# Patient Record
Sex: MALE | Race: WHITE | HISPANIC OR LATINO | URBAN - METROPOLITAN AREA
[De-identification: names, ages, dates, MRNs, and addresses within clinical notes are randomized per-mention and may not be internally consistent; named-entity substitution may affect disease eponyms.]

---

## 2024-07-27 ENCOUNTER — EMERGENCY (EMERGENCY)
Facility: HOSPITAL | Age: 69
LOS: 1 days | Discharge: HOME | End: 2024-07-27
Attending: STUDENT IN AN ORGANIZED HEALTH CARE EDUCATION/TRAINING PROGRAM
Payer: SELF-PAY

## 2024-07-27 VITALS
WEIGHT: 136.69 LBS | HEIGHT: 66.93 IN | OXYGEN SATURATION: 98 % | DIASTOLIC BLOOD PRESSURE: 83 MMHG | HEART RATE: 72 BPM | TEMPERATURE: 98 F | SYSTOLIC BLOOD PRESSURE: 123 MMHG | RESPIRATION RATE: 20 BRPM

## 2024-07-27 DIAGNOSIS — R07.9 CHEST PAIN, UNSPECIFIED: ICD-10-CM

## 2024-07-27 LAB
ALBUMIN SERPL ELPH-MCNC: 3.9 G/DL — SIGNIFICANT CHANGE UP (ref 3.3–5.2)
ALP SERPL-CCNC: 128 U/L — HIGH (ref 40–120)
ALT FLD-CCNC: 27 U/L — SIGNIFICANT CHANGE UP
ANION GAP SERPL CALC-SCNC: 9 MMOL/L — SIGNIFICANT CHANGE UP (ref 5–17)
APTT BLD: 29 SEC — SIGNIFICANT CHANGE UP (ref 24.5–35.6)
AST SERPL-CCNC: 31 U/L — SIGNIFICANT CHANGE UP
BASOPHILS # BLD AUTO: 0.06 K/UL — SIGNIFICANT CHANGE UP (ref 0–0.2)
BASOPHILS NFR BLD AUTO: 0.8 % — SIGNIFICANT CHANGE UP (ref 0–2)
BILIRUB SERPL-MCNC: 0.3 MG/DL — LOW (ref 0.4–2)
BUN SERPL-MCNC: 22.7 MG/DL — HIGH (ref 8–20)
CALCIUM SERPL-MCNC: 8.6 MG/DL — SIGNIFICANT CHANGE UP (ref 8.4–10.5)
CHLORIDE SERPL-SCNC: 106 MMOL/L — SIGNIFICANT CHANGE UP (ref 96–108)
CO2 SERPL-SCNC: 24 MMOL/L — SIGNIFICANT CHANGE UP (ref 22–29)
CREAT SERPL-MCNC: 1.15 MG/DL — SIGNIFICANT CHANGE UP (ref 0.5–1.3)
D DIMER BLD IA.RAPID-MCNC: <150 NG/ML DDU — SIGNIFICANT CHANGE UP
EGFR: 68 ML/MIN/1.73M2 — SIGNIFICANT CHANGE UP
EOSINOPHIL # BLD AUTO: 0.19 K/UL — SIGNIFICANT CHANGE UP (ref 0–0.5)
EOSINOPHIL NFR BLD AUTO: 2.5 % — SIGNIFICANT CHANGE UP (ref 0–6)
GLUCOSE SERPL-MCNC: 97 MG/DL — SIGNIFICANT CHANGE UP (ref 70–99)
HCT VFR BLD CALC: 35 % — LOW (ref 39–50)
HGB BLD-MCNC: 12.1 G/DL — LOW (ref 13–17)
IMM GRANULOCYTES NFR BLD AUTO: 0.1 % — SIGNIFICANT CHANGE UP (ref 0–0.9)
INR BLD: 1.38 RATIO — HIGH (ref 0.85–1.18)
LIDOCAIN IGE QN: 15 U/L — LOW (ref 22–51)
LYMPHOCYTES # BLD AUTO: 2.34 K/UL — SIGNIFICANT CHANGE UP (ref 1–3.3)
LYMPHOCYTES # BLD AUTO: 30.5 % — SIGNIFICANT CHANGE UP (ref 13–44)
MAGNESIUM SERPL-MCNC: 2 MG/DL — SIGNIFICANT CHANGE UP (ref 1.6–2.6)
MCHC RBC-ENTMCNC: 29.3 PG — SIGNIFICANT CHANGE UP (ref 27–34)
MCHC RBC-ENTMCNC: 34.6 GM/DL — SIGNIFICANT CHANGE UP (ref 32–36)
MCV RBC AUTO: 84.7 FL — SIGNIFICANT CHANGE UP (ref 80–100)
MONOCYTES # BLD AUTO: 0.57 K/UL — SIGNIFICANT CHANGE UP (ref 0–0.9)
MONOCYTES NFR BLD AUTO: 7.4 % — SIGNIFICANT CHANGE UP (ref 2–14)
NEUTROPHILS # BLD AUTO: 4.49 K/UL — SIGNIFICANT CHANGE UP (ref 1.8–7.4)
NEUTROPHILS NFR BLD AUTO: 58.7 % — SIGNIFICANT CHANGE UP (ref 43–77)
NT-PROBNP SERPL-SCNC: 43 PG/ML — SIGNIFICANT CHANGE UP (ref 0–300)
PLATELET # BLD AUTO: 219 K/UL — SIGNIFICANT CHANGE UP (ref 150–400)
POTASSIUM SERPL-MCNC: 4.4 MMOL/L — SIGNIFICANT CHANGE UP (ref 3.5–5.3)
POTASSIUM SERPL-SCNC: 4.4 MMOL/L — SIGNIFICANT CHANGE UP (ref 3.5–5.3)
PROT SERPL-MCNC: 6.5 G/DL — LOW (ref 6.6–8.7)
PROTHROM AB SERPL-ACNC: 15.2 SEC — HIGH (ref 9.5–13)
RBC # BLD: 4.13 M/UL — LOW (ref 4.2–5.8)
RBC # FLD: 14.1 % — SIGNIFICANT CHANGE UP (ref 10.3–14.5)
SODIUM SERPL-SCNC: 139 MMOL/L — SIGNIFICANT CHANGE UP (ref 135–145)
TROPONIN T, HIGH SENSITIVITY RESULT: 8 NG/L — SIGNIFICANT CHANGE UP (ref 0–51)
TROPONIN T, HIGH SENSITIVITY RESULT: 8 NG/L — SIGNIFICANT CHANGE UP (ref 0–51)
WBC # BLD: 7.66 K/UL — SIGNIFICANT CHANGE UP (ref 3.8–10.5)
WBC # FLD AUTO: 7.66 K/UL — SIGNIFICANT CHANGE UP (ref 3.8–10.5)

## 2024-07-27 PROCEDURE — 93010 ELECTROCARDIOGRAM REPORT: CPT

## 2024-07-27 PROCEDURE — 70450 CT HEAD/BRAIN W/O DYE: CPT | Mod: 26,MC

## 2024-07-27 PROCEDURE — 71046 X-RAY EXAM CHEST 2 VIEWS: CPT | Mod: 26

## 2024-07-27 PROCEDURE — 99223 1ST HOSP IP/OBS HIGH 75: CPT

## 2024-07-27 PROCEDURE — 99284 EMERGENCY DEPT VISIT MOD MDM: CPT

## 2024-07-27 RX ORDER — ASPIRIN 325 MG/1
162 TABLET, FILM COATED ORAL ONCE
Refills: 0 | Status: COMPLETED | OUTPATIENT
Start: 2024-07-27 | End: 2024-07-27

## 2024-07-27 RX ORDER — LOSARTAN POTASSIUM 100 MG/1
100 TABLET, FILM COATED ORAL DAILY
Refills: 0 | Status: ACTIVE | OUTPATIENT
Start: 2024-07-27 | End: 2025-06-25

## 2024-07-27 RX ADMIN — ASPIRIN 162 MILLIGRAM(S): 325 TABLET, FILM COATED ORAL at 13:32

## 2024-07-27 NOTE — ED PROVIDER NOTE - CLINICAL SUMMARY MEDICAL DECISION MAKING FREE TEXT BOX
hx and physical as noted above. patient not hypertensive, no focal neuro deficits hx and physical as noted above. patient not hypertensive, equal radial pulses, no focal neuro deficits. Consider angina vs ACS, PE, PNA. No clinical suspicion for acute aortic pathology, PTX, or esophgeal rupture based on history and physical exam findings. Consider cardiology consult, dispo and further interventions penidng. hx and physical as noted above. patient not hypertensive, equal radial pulses, no focal neuro deficits. Ambulatory with steady gait.Consider angina vs ACS, PE, PNA. No clinical suspicion for acute aortic pathology, PTX, or esophgeal rupture based on history and physical exam findings. Consider cardiology consult, dispo and further interventions penidng.    Patient seen and evaluated by cardiology, recommending echocardiogram.  Place in observation

## 2024-07-27 NOTE — ED CDU PROVIDER INITIAL DAY NOTE - ATTENDING APP SHARED VISIT CONTRIBUTION OF CARE
I, Sahtya Arredondo, performed the initial face to face bedside interview with this patient regarding history of present illness, review of symptoms and relevant past medical, social and family history.  I completed an independent physical examination.  I was the initial provider who evaluated this patient. I have signed out the follow up of any pending tests (i.e. labs, radiological studies) to the ACP.  I have communicated the patient’s plan of care and disposition with the ACP.

## 2024-07-27 NOTE — ED CDU PROVIDER INITIAL DAY NOTE - OBJECTIVE STATEMENT
70 yo male hx of HTN on losartan presented to the ED with reported epiosodic dizziness and chest tightness, reports that he has had increased stressors as of lately as wife has been recently admitted to the hospital. reports that he has been without his blood pressure medication for about the last month. CT on initial ed eval without acute infarct and currently asymptomatic. states that dizziness comes at all different times not positional.no associated numbness or tingling to extremities and does not feel off balance. no reported chest pain or sob currently. pt reportedly had headache early which he reports that usually means his pressure is high. BP stable while in ED.   seen by cardiology with recs for TTE, EKG non ischemic, Trops flat x 2.

## 2024-07-27 NOTE — CONSULT NOTE ADULT - PROBLEM SELECTOR RECOMMENDATION 9
.  - denies chest pain but endorses SOB with stairs, possibly anginal equivalent  - Trops negative x 2  - EKG SB with PVC and aberrancy, no ischemic changes  - HTN well controlled on losartan  - CT head without acute pathology  - pending TTE for evaluation of cardiac function and any valvular abnormalities  - If TTE without significant abnormality, no need for further inpatient cardiac workup  - if TTE with abnormality plan for further workup based on results

## 2024-07-27 NOTE — ED CDU PROVIDER INITIAL DAY NOTE - CLINICAL SUMMARY MEDICAL DECISION MAKING FREE TEXT BOX
68 y/o M with PMhx HTN on losartan who presents to the ED for a BP check after feeling some chest tightness and headache also reported intermittent dizziness over the last several days, no focal deficits on exam no acute chest pain or sob. seen by cards with recs for TTE.   BP stable in ED. ekg non ischemic and trops x 2 negative.   CT without acute findings

## 2024-07-27 NOTE — ED CDU PROVIDER INITIAL DAY NOTE - PHYSICAL EXAMINATION
Gen: Well appearing in NAD  Head: NC/AT  Neck: trachea midline  Resp:  No distress CTA   CARD RR no peripheral edmea   Ext: no deformities  Neuro:  A&O appears non focal, ambulatory stable gait no ataxia   Skin:  Warm and dry as visualized  Psych:  Normal affect and mood

## 2024-07-27 NOTE — ED ADULT NURSE NOTE - CAS ELECT INFOMATION PROVIDED
Patient AA&Ox4, resp even/unlabored, ambulatory, steady gait noted, discharged home with all belongings. Med rec and discharge instructions explained and given to pt by SERENA Snow. Patient verbalizes understanding on physician follow up, medication regimen and next dose, s/s of complications and monitoring. No distress noted. VSS, recorded in EMR. Peripheral IV removed, intact, bleeding controlled./DC instructions

## 2024-07-27 NOTE — CONSULT NOTE ADULT - SUBJECTIVE AND OBJECTIVE BOX
[facility]                                              CARDIOLOGY AT [place] [address]                                             Telephone: [phone]. Fax:[phone]                                                       CARDIOLOGY CONSULTATION NOTE                                                                                             History obtained by: Patient and medical record  Community Cardiologist: none   obtained: Yes [ x ] No [  ] ACP  Reason for Consultation: chest pain  Available out pt records reviewed: Yes [  ] No [ x ]    Chief complaint:    Patient is a 69y old  Male who presents with a chief complaint of     HPI:  70 y/o M with PMhx HTN on losartan who presents to the ED for a BP check after feeling some chest tightness and headache. Patient is visiting from [facility], his wife is currently inpatient here at [facility]. Today he felt a little off while visiting and felt a headache which he sometimes feels when his BP was high. He asked RNs to check his BP and was directed to the ED for further. In the ED labs unremarkable, no complaints of chest pain, however does endorse occasional SOB when climbing stairs. He has never seen a cardiologist and Denies chest pain, back pain, headache, dizziness, SOB, SCOTT, diaphoresis, syncope or N/V.      CARDIAC TESTING   ECHO: PENDING    STRESS:    CATH:     ELECTROPHYSIOLOGY:     PAST MEDICAL HISTORY    PAST SURGICAL HISTORY      SOCIAL HISTORY:    CIGARETTES:   2 cigarettes   ALCOHOL: occasional  DRUGS: denies    FAMILY HISTORY:    Family History of Cardiovascular Disease:  Yes [  ] No [ x ]  Coronary Artery Disease in first degree relative: Yes [  ] No [ x ]  Sudden Cardiac Death in First degree relative: Yes [  ] No [ x ]    HOME MEDICATIONS:      CURRENT CARDIAC MEDICATIONS:      CURRENT OTHER MEDICATIONS:      ALLERGIES:   No Known Allergies      REVIEW OF SYMPTOMS:   CONSTITUTIONAL: No fever, no chills, no weight loss, no weight gain, no fatigue   ENMT:  No vertigo; No sinus or throat pain  NECK: No pain or stiffness  CARDIOVASCULAR: No chest pain, no dyspnea, no syncope/presyncope, no palpitations, no dizziness, no Orthopnea, no Paroxsymal nocturnal dyspnea  RESPIRATORY: no Shortness of breath, no cough, no wheezing  : No dysuria, no hematuria   GI: No dark color stool, no nausea, no diarrhea, no constipation, no abdominal pain   NEURO: No headache, no slurred speech   MUSCULOSKELETAL: No joint pain or swelling; No muscle, back, or extremity pain  PSYCH: No agitation, no anxiety.    ALL OTHER REVIEW OF SYSTEMS ARE NEGATIVE.    VITAL SIGNS:  T(C): 36.7 (07-27-24 @ 15:22), Max: 36.7 (07-27-24 @ 11:22)  T(F): 98 (07-27-24 @ 15:22), Max: 98 (07-27-24 @ 11:22)  HR: 58 (07-27-24 @ 15:22) (58 - 72)  BP: 124/73 (07-27-24 @ 15:22) (123/83 - 124/73)  RR: 20 (07-27-24 @ 15:22) (20 - 20)  SpO2: 98% (07-27-24 @ 15:22) (98% - 98%)    INTAKE AND OUTPUT:       PHYSICAL EXAM:  Constitutional: Comfortable . No acute distress.   HEENT: Atraumatic and normocephalic , neck is supple . no JVD. No carotid bruit.  CNS: A&Ox3. No focal deficits.   Respiratory: CTAB, unlabored   Cardiovascular: RRR normal s1 s2. No murmur. No rubs or gallop.  Gastrointestinal: Soft, non-tender. +Bowel sounds.   Extremities: 2+ Peripheral Pulses, No clubbing, cyanosis, or edema  Psychiatric: Calm . no agitation.   Skin: Warm and dry, no ulcers on extremities     LABS:                            12.1   7.66  )-----------( 219      ( 27 Jul 2024 13:15 )             35.0     07-27    139  |  106  |  22.7<H>  ----------------------------<  97  4.4   |  24.0  |  1.15    Ca    8.6      27 Jul 2024 13:15  Mg     2.0     07-27    TPro  6.5<L>  /  Alb  3.9  /  TBili  0.3<L>  /  DBili  x   /  AST  31  /  ALT  27  /  AlkPhos  128<H>  07-27    PT/INR - ( 27 Jul 2024 13:15 )   PT: 15.2 sec;   INR: 1.38 ratio         PTT - ( 27 Jul 2024 13:15 )  PTT:29.0 sec  Urinalysis Basic - ( 27 Jul 2024 13:15 )    Color: x / Appearance: x / SG: x / pH: x  Gluc: 97 mg/dL / Ketone: x  / Bili: x / Urobili: x   Blood: x / Protein: x / Nitrite: x   Leuk Esterase: x / RBC: x / WBC x   Sq Epi: x / Non Sq Epi: x / Bacteria: x              INTERPRETATION OF TELEMETRY:     ECG: SB with PACs and Abberancy  Prior ECG: Yes [  ] No [  ]    RADIOLOGY & ADDITIONAL STUDIES:    X-ray:    CT scan:   MRI:   US:

## 2024-07-27 NOTE — ED ADULT NURSE NOTE - NSFALLUNIVINTERV_ED_ALL_ED
Bed/Stretcher in lowest position, wheels locked, appropriate side rails in place/Call bell, personal items and telephone in reach/Instruct patient to call for assistance before getting out of bed/chair/stretcher/Non-slip footwear applied when patient is off stretcher/[place] to call system/Physically safe environment - no spills, clutter or unnecessary equipment/Purposeful proactive rounding/Room/bathroom lighting operational, light cord in reach

## 2024-07-27 NOTE — ED ADULT NURSE REASSESSMENT NOTE - NSFALLUNIVINTERV_ED_ALL_ED
Bed/Stretcher in lowest position, wheels locked, appropriate side rails in place/Call bell, personal items and telephone in reach/Instruct patient to call for assistance before getting out of bed/chair/stretcher/Non-slip footwear applied when patient is off stretcher/[place] to call system/Physically safe environment - no spills, clutter or unnecessary equipment/Purposeful proactive rounding/Room/bathroom lighting operational, light cord in reach

## 2024-07-27 NOTE — ED ADULT TRIAGE NOTE - CHIEF COMPLAINT QUOTE
Pt states he has been feeling b/p running high for few days due to dizziness - has not been consistent with b/p meds- normotensive during Triage

## 2024-07-27 NOTE — CONSULT NOTE ADULT - ASSESSMENT
70 y/o M with PMhx HTN on losartan who presents to the ED for a BP check after feeling some chest tightness and headache. Patient is visiting from [facility], his wife is currently inpatient here at [facility]. Today he felt a little off while visiting and felt a headache which he sometimes feels when his BP was high. He asked RNs to check his BP and was directed to the ED for further. In the ED labs unremarkable, no complaints of chest pain, however does endorse occasional SOB when climbing stairs. He has never seen a cardiologist and Denies chest pain, back pain, headache, dizziness, SOB, SCOTT, diaphoresis, syncope or N/V.

## 2024-07-27 NOTE — ED ADULT NURSE NOTE - OBJECTIVE STATEMENT
A&O x 3 c/o episode of dizziness. Pt states he was visiting wife in hospital when he felt anxiety and dizziness; reports relief in symptoms during assessment. Pt also states he has not taken his irbesartan since arriving to [facility] 2 weeks ago. Pt alert, awake and following directions. Airway patent. Respirations unlabored. No JVD or diaphoresis. Pt on telemonitor with . Bed locked and in lowest position. Call bell within reach. Able to make needs known.

## 2024-07-27 NOTE — CONSULT NOTE ADULT - NS ATTEND AMEND GEN_ALL_CORE FT
Patient seen and examined by me.    T(C): 36.7 (07-27-24 @ 15:22), Max: 36.7 (07-27-24 @ 11:22)  HR: 58 (07-27-24 @ 15:22) (58 - 72)  BP: 124/73 (07-27-24 @ 15:22) (123/83 - 124/73)  RR: 20 (07-27-24 @ 15:22) (20 - 20)  SpO2: 98% (07-27-24 @ 15:22) (98% - 98%)  Patient alert and awake.  Chest- Bilateral Clear BS  Cardiac- S1 and S2  Abdomen- Soft    Assessment/Plan:  ECHO  Nuclear Stress Test Monday    I have discussed my recommendation with the PA which are outlined above.  Will follow.

## 2024-07-27 NOTE — ED PROVIDER NOTE - PHYSICAL EXAMINATION
PHYSICAL EXAM:   General: well-appearing, appears stated age, not in extremis   HEENT: NC/AT, PERRLA, eomi without nystagmus,  airway patent  Cardiovascular: regular rate and rhythm, + S1/S2, no murmurs, rubs, gallops appreciated  Respiratory: clear to auscultation bilaterally, good aeration bilaterally, nonlabored respirations  Abdominal: soft, nontender, nondistended, no rebound, guarding or rigidity  Extremities: no LE edema or shruthi tenderness b/l. Radial pulses equal and strong b/l  Neuro: Awake alert interactive. CN2-12 grossly intact, Strength 5/5 in upper and lower extremities. Sensation intact to light touch in upper and lower extremities., finger-to-nose  WNL.   Psychiatric: appropriate mood and affect.   Skin: appropriate color, warm, dry.   -Latonya Alexander MD Attending Physician PHYSICAL EXAM:   General: well-appearing, appears stated age, not in extremis   HEENT: NC/AT, PERRLA, eomi without nystagmus,  airway patent  Cardiovascular: regular rate and rhythm, + S1/S2, no murmurs, rubs, gallops appreciated  Respiratory: clear to auscultation bilaterally, good aeration bilaterally, nonlabored respirations  Abdominal: soft, nontender, nondistended, no rebound, guarding or rigidity  Extremities: no LE edema or shruthi tenderness b/l. Radial pulses equal and strong b/l  Neuro: Awake alert interactive. CN2-12 grossly intact, Strength 5/5 in upper and lower extremities. Sensation intact to light touch in upper and lower extremities., finger-to-nose  WNL. Ambulatory with steady gait  Psychiatric: appropriate mood and affect.   Skin: appropriate color, warm, dry.   -Latonya Alexander MD Attending Physician

## 2024-07-27 NOTE — ED PROVIDER NOTE - OBJECTIVE STATEMENT
69-year-old male past medical history of hypertension presents with room spinning dizziness/imbalance and chest tightness.  Patient states he has a history of hypertension but over the last year has had episodes of pneumonia and thinks his blood pressure has lowered since then.  Has been off his 150 mg daily losartan since this traveling here from [facility] over the last month.  Patient resides in [facility] and is just visiting here.  Patient feels intermittent dizziness, unsteady gait.  Does not feel dizzy now.  Also endorsing left-sided chest tightness over the years worse with exertion and when he is stressed.  Patient endorses current stress as his wife is admitted upstairs.  Sometimes chest tightness is associated with nausea, sometimes with diaphoresis and sometimes with shortness of breath and states it radiates up to the back of his head.  Patient is not currently feeling short of breath.     or[place] 69-year-old male past medical history of hypertension presents with room spinning dizziness/imbalance and chest tightness.  Patient states he has a history of hypertension but over the last year has had episodes of pneumonia and thinks his blood pressure has lowered since then.  Has been off his 150 mg daily losartan since this traveling here from [facility] over the last month.  Patient resides in [facility] and is just visiting here.  Patient feels intermittent dizziness, unsteady gait.  Does not feel dizzy now.  Also endorsing left-sided chest tightness over the years worse with exertion and when he is stressed.  Patient endorses current stress as his wife is admitted upstairs.  Sometimes chest tightness is associated with nausea, sometimes with diaphoresis and sometimes with shortness of breath and states it radiates up to the back of his head.  Patient is not currently feeling short of breath.Patient states he presents to have his blood pressure checked.      orlando

## 2024-07-28 PROCEDURE — 99232 SBSQ HOSP IP/OBS MODERATE 35: CPT

## 2024-07-28 PROCEDURE — 99283 EMERGENCY DEPT VISIT LOW MDM: CPT

## 2024-07-28 RX ADMIN — LOSARTAN POTASSIUM 100 MILLIGRAM(S): 100 TABLET, FILM COATED ORAL at 05:31

## 2024-07-28 NOTE — PROGRESS NOTE ADULT - PROBLEM SELECTOR PLAN 1
- denies chest pain but endorses SOB with stairs, possibly anginal equivalent  - Trops negative x 2  - EKG SB with PVC and aberrancy, no ischemic changes  - HTN well controlled on losartan  - CT head without acute pathology  - pending TTE for evaluation of cardiac function and any valvular abnormalities  - If TTE without significant abnormality, no need for further inpatient cardiac workup  - if TTE with abnormality plan for further workup based on results. - denies chest pain but endorses SOB with stairs, possibly anginal equivalent  - Trops negative x 2  - EKG SB with PVC and aberrancy, no ischemic changes  - HTN well controlled on losartan  - CT head without acute pathology   - TTE EF 64 % no regional wall motion abnormalities seen.  - pt c/o SOB with stairs, plan for NST tomorrow. NPO after midnight tonight.

## 2024-07-28 NOTE — PROGRESS NOTE ADULT - NS ATTEND AMEND GEN_ALL_CORE FT
Patient seen and examined by me.      Patient alert and awake.  Chest- Bilateral Clear BS  Cardiac- S1 and S2  Abdomen- Soft    Assessment/Plan:  ECHO results noted  Nuclear Stress Test Monday    I have discussed my recommendation with the PA which are outlined above.  Will follow.

## 2024-07-28 NOTE — PROGRESS NOTE ADULT - ASSESSMENT
70 y/o M with PMhx HTN on losartan who presents to the ED for a BP check after feeling some chest tightness and headache. Patient is visiting from [facility], his wife is currently inpatient here at [facility]. Today he felt a little off while visiting and felt a headache which he sometimes feels when his BP was high. He asked RNs to check his BP and was directed to the ED for further. In the ED labs unremarkable, no complaints of chest pain, however does endorse occasional SOB when climbing stairs. He has never seen a cardiologist and Denies chest pain, back pain, headache, dizziness, SOB, SCOTT, diaphoresis, syncope or N/V.

## 2024-07-28 NOTE — PROGRESS NOTE ADULT - SUBJECTIVE AND OBJECTIVE BOX
[facility]                                                         CARDIOLOGY AT [address], [facility] [place] [address]                                                         Telephone: [phone]. Fax:[phone]                                                                             PROGRESS NOTE    Reason for follow up: chest pain   Update: Pt seen and examined at the bedside. ED  Case was used. Pt is in no acute distress and has no complaints.     Review of symptoms:   Cardiac:  No chest pain. No dyspnea. No palpitations.  Respiratory: no cough. No dyspnea  Gastrointestinal: No diarrhea. No abdominal pain. No bleeding.   Neuro: No focal neuro complaints.    Vitals:  T(C): 36.6 (07-28-24 @ 07:43), Max: 36.7 (07-27-24 @ 11:22)  HR: 58 (07-28-24 @ 08:26) (53 - 72)  BP: 100/65 (07-28-24 @ 08:26) (97/65 - 124/73)  RR: 16 (07-28-24 @ 08:26) (16 - 20)  SpO2: 97% (07-28-24 @ 08:26) (97% - 99%)  Wt(kg): --  I&O's Summary    Weight (kg): 62 (07-27 @ 11:22)    PHYSICAL EXAM:  Appearance: Comfortable. No acute distress  HEENT:  Atraumatic. Normocephalic.  Normal oral mucosa  Neurologic: A & O x 3, no gross focal deficits.  Cardiovascular: RRR S1 S2, No murmur, no rubs/gallops. No JVD  Respiratory: Lungs clear to auscultation, unlabored   Gastrointestinal:  Soft, Non-tender, + BS  Lower Extremities: 2+ Peripheral Pulses, No clubbing, cyanosis, or edema  Psychiatry: Patient is calm. No agitation.   Skin: warm and dry.    CURRENT CARDIAC MEDICATIONS:  losartan 100 milliGRAM(s) Oral daily    CURRENT OTHER MEDICATIONS:    LABS:	 	                        12.1   7.66  )-----------( 219      ( 27 Jul 2024 13:15 )             35.0     07-27    139  |  106  |  22.7<H>  ----------------------------<  97  4.4   |  24.0  |  1.15    Ca    8.6      27 Jul 2024 13:15  Mg     2.0     07-27    TPro  6.5<L>  /  Alb  3.9  /  TBili  0.3<L>  /  DBili  x   /  AST  31  /  ALT  27  /  AlkPhos  128<H>  07-27    PT/INR/PTT ( 27 Jul 2024 13:15 )                       :                       :      15.2         :       29.0                  .        .                   .              .           .       1.38        .                                       Lipid Profile:   HgA1c:   TSH:     TELEMETRY: NSR   ECG: NSR     DIAGNOSTIC TESTING:  [ ] Echocardiogram:   [ ]  Catheterization:  [ ] Stress Test:    OTHER:

## 2024-07-28 NOTE — ED CDU PROVIDER SUBSEQUENT DAY NOTE - PROGRESS NOTE DETAILS
seen the pt at the bed side NAD . explained about the Echo result . spoke with cardiology pt need NST tomorrow - pt agreed and understood . denies any chest pain

## 2024-07-29 VITALS
SYSTOLIC BLOOD PRESSURE: 94 MMHG | HEART RATE: 66 BPM | TEMPERATURE: 98 F | OXYGEN SATURATION: 97 % | DIASTOLIC BLOOD PRESSURE: 66 MMHG | RESPIRATION RATE: 18 BRPM

## 2024-07-29 PROCEDURE — 99239 HOSP IP/OBS DSCHRG MGMT >30: CPT

## 2024-07-29 PROCEDURE — 93018 CV STRESS TEST I&R ONLY: CPT | Mod: MC

## 2024-07-29 PROCEDURE — 93016 CV STRESS TEST SUPVJ ONLY: CPT | Mod: MC

## 2024-07-29 PROCEDURE — 78452 HT MUSCLE IMAGE SPECT MULT: CPT | Mod: 26,MC

## 2024-07-29 NOTE — ED CDU PROVIDER SUBSEQUENT DAY NOTE - PHYSICAL EXAMINATION
Gen: No acute distress, non toxic  HEENT: Mucous membranes moist, pink conjunctivae, EOMI  CV: RRR, nl s1/s2.  Resp: CTAB, normal rate and effort  GI: Abdomen soft, NT, ND. No rebound, no guarding  Neuro: A&O x4, MAEx4. 5/5 str ext x 4. Sensation intact, symmetric throughout. No fnd's. cerebellar fxn intact.  MSK: No spine or joint tenderness to palpation  Skin: No rashes. intact and perfused.   Vascular: No LE edema. Radial pulses 2+ b/l
Gen: No acute distress, non toxic  HEENT: Mucous membranes moist, pink conjunctivae, EOMI  CV: RRR, nl s1/s2.  Resp: CTAB, normal rate and effort  GI: Abdomen soft, NT, ND. No rebound, no guarding  Neuro: A&O x4, MAEx4. 5/5 str ext x 4. Sensation intact, symmetric throughout. No fnd's. cerebellar fxn intact.  MSK: No spine or joint tenderness to palpation  Skin: No rashes. intact and perfused.   Vascular: No LE edema. Radial pulses 2+ b/l

## 2024-07-29 NOTE — ED ADULT NURSE REASSESSMENT NOTE - ANCILLARY STATUS
MD Coreas and SERENA Tsang/physician at bedside
cardiology NP/physician at bedside
cardiology attending MD/physician at bedside
NST results/cardiovascular tests pending
NST/awaiting radiology

## 2024-07-29 NOTE — CHART NOTE - NSCHARTNOTEFT_GEN_A_CORE
70 y/o M with PMhx HTN on losartan who presents to the ED for a BP check after feeling some chest tightness and headache. Patient is visiting from [facility], his wife is currently inpatient here at [facility]. Today he felt a little off while visiting and felt a headache which he sometimes feels when his BP was high. He asked RNs to check his BP and was directed to the ED for further. In the ED labs unremarkable, no complaints of chest pain, however does endorse occasional SOB when climbing stairs. He has never seen a cardiologist and Denies chest pain, back pain, headache, dizziness, SOB, SCOTT, diaphoresis, syncope or N/V.    < from: TTE W or WO Ultrasound Enhancing Agent (07.28.24 @ 09:15) >      TRANSTHORACIC ECHOCARDIOGRAM REPORT  ________________________________________________________________________________                                      _______       Pt. Name:       RAVEN ALEXANDRE Study Date:    7/28/2024  MRN:            [id number]        YOB: 1955  Accession #:    [id number]       Age:           69 years  Account#:       [id number]      Gender:        M  Heart Rate:                     Height:        66.00 in (167.64 cm)  Rhythm:                         Weight:        136.00 lb (61.69 kg)  Blood Pressure: 97/65 mmHg      BSA/BMI:       1.70 m² / 21.95 kg/m²  ________________________________________________________________________________________  Referring Physician:    Sally Hussein  Interpreting Physician: Juan Powers MD  Primary Sonographer:    Addis Higuera    CPT:               ECHO TTE WO CON COMP W DOPP - 59920.m  Indication(s):     Chest pain, unspecified - R07.9  Procedure:         Transthoracic echocardiogram with 2-D, M-mode and complete                     spectral and color flow Doppler.  Ordering Location: [facility]  Admission Status:  ED    _______________________________________________________________________________________     CONCLUSIONS:      1. Left ventricular systolic function is normal with an ejection fraction of 64 % by Vallejo's method of disks. There are no regional wall motion abnormalities seen.   2. Normal left ventricular diastolic function.   3. Normal right ventricular cavity size and normal right ventricular systolic function.   4. No significant valvular disease.   5. No prior echocardiogram is available for comparison.   6. 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    < end of copied text >    < from: Nuclear Stress Test-Pharmacologic.. (07.29.24 @ 07:06) >      Nuclear Pharmacologic Stress Test Report        Patient: RAVEN ALEXANDRE                  Study Date: 7/29/2024          MRN: [id number]                               Birth 1955 Age: 69                                                  Date/Age: years     Access #: [id number]                            Gender: M    Order Loc: [facility]                                Height: 167.6 cm/ 66.0 in       Request [id number] Dottie Villagran         Weight: 61.69 kg/ 136.00 lb         Phys: MD    Procedure: Rest|Stress Pharmacologic          BSA/ BMI: 1.70 m²/ 21.95 kg/m²   Indication: Chest Pain - R07.9            Admiss Status:   Fellow/ACP: Talisha Rolon NP               Fellow/ACP:    ---------------------------------------------------------------------------------------------------------------------------------------------------------  Procedure Code: MYOCARDIAL SPECT MULTIPLE - 17761.m;TC 99M SESTAMIBI PER DOSE -                  .m;INJECTION REGADENOSON - .m;TC 99M SESTAMIBI PER                  DOSE 2nd - .m;STRESS TEST TRACING ONLY - 85183.m    ---------------------------------------------------------------------------------------------------------------------------------------------------------  History:       70 yo male with c/o chest pain and headache.  Risk Factors:  Hypertension.  Medications:   Losartan.  NDC Number(s): 80410-799-09  Allergies:     None    --------------------------------------------------------------------------------------------------------------------------------------------------------Conclusions:   1. Normal myocardial perfusion scan, with no evidence of infarction or inducible ischemia.   2. Baseline electrocardiogram: sinus rhythm with frequent premature atrial contractions.   3. The left ventricle is normal in function and normal in size. The post stress left ventricular EF is 61 %. The stress end diastolic volume is 77 ml and systolic volume is 30 ml.   4. Arrhythmias: There is frequent premature ventricular contractions noted during stress.    ------------------------------------------------------------------------------------------------------    < end of copied text >    - Normal NST, Normal TTE.  - continue home losartan    No further inpatient cardiac work up at this time.  Will sign off.  Please reconsult if needed.

## 2024-07-29 NOTE — ED CDU PROVIDER SUBSEQUENT DAY NOTE - NS ED ATTENDING STATEMENT MOD
This was a shared visit with the BRENNA. I reviewed and verified the documentation.
This was a shared visit with the BRENNA. I reviewed and verified the documentation.

## 2024-07-29 NOTE — ED CDU PROVIDER SUBSEQUENT DAY NOTE - ATTENDING APP SHARED VISIT CONTRIBUTION OF CARE
69M in obs for cards recs / tte, pending final recommendations
Lyudmila [id number]-old male noncompliant with blood pressure medications placed in observation unit for cardiology eval who presented with chest pain and headache.    No acute events during the day or night    Plan for echo and stress test.

## 2024-07-29 NOTE — ED CDU PROVIDER SUBSEQUENT DAY NOTE - CLINICAL SUMMARY MEDICAL DECISION MAKING FREE TEXT BOX
70 y/o M with PMhx HTN on losartan who presents to the ED for a BP check after feeling some chest tightness and headache also reported intermittent dizziness over the last several days, no focal deficits on exam no acute chest pain or sob. seen by cards with recs for TTE.   BP stable in ED. ekg non ischemic and trops x 2 negative.   CT without acute findings  cards consulted- pending TTE and NST
Continue CDU intake orders await consult recommendations, lab results, and imaging results.

## 2024-07-29 NOTE — ED CDU PROVIDER DISPOSITION NOTE - ATTENDING APP SHARED VISIT CONTRIBUTION OF CARE
69M in obs for cards recs / tte, now s/p nst, stable for outpt follow up, will d/c w. return precautions

## 2024-07-29 NOTE — ED CDU PROVIDER SUBSEQUENT DAY NOTE - NS ED ROS FT
Gen: denies fever, chills, fatigue, weight loss  Skin: denies rashes, laceration, bruising  HEENT: denies visual changes, ear pain, nasal congestion, throat pain  Respiratory: denies SCOTT, SOB, cough, wheezing  Cardiovascular: denies chest pain, palpitations, diaphoresis, LE edema  GI: denies abdominal pain, n/v/d  : denies dysuria, frequency, urgency, bowel/bladder incontinence  MSK: denies joint swelling/pain, back pain, neck pain  Neuro: +Dizziness ,H/A. denies LOC, weakness, numbness  Psych: denies anxiety, depression, SI/HI, visual/auditory hallucinations
Gen: denies fever, chills, fatigue, weight loss  Skin: denies rashes, laceration, bruising  HEENT: denies visual changes, ear pain, nasal congestion, throat pain  Respiratory: denies SCOTT, SOB, cough, wheezing  Cardiovascular: denies chest pain, palpitations, diaphoresis, LE edema  GI: denies abdominal pain, n/v/d  : denies dysuria, frequency, urgency, bowel/bladder incontinence  MSK: denies joint swelling/pain, back pain, neck pain  Neuro: +Dizziness ,H/A. denies LOC, weakness, numbness  Psych: denies anxiety, depression, SI/HI, visual/auditory hallucinations

## 2024-07-29 NOTE — ED ADULT NURSE REASSESSMENT NOTE - NS ED NURSE REASSESS COMMENT FT1
Assumed care of patient at 07:15 from JEAN MARIE Fernandez. Charting as noted. Patient AA&Ox4, resp even/unlabored, presents to ED c/o room spinning dizziness and chest tightness which has since resolved. At time of assessment, patient denies pain/discomfort, denies CP/SOB. Patient updated on the plan of care, awaiting TTE and NST. Stretcher locked in lowest position, IV site flushed w/ NS. No redness, swelling or pain noted to site. No signs of acute distress noted, safety maintained. Pt remains on CM in sinus elizabeth, rate 58, SpO2 98% on room air.
Assumed care of patient at 07:30 from JEAN MARIE Su. Charting as noted. Patient AA&Ox4, resp even/unlabored, presents to ED c/o chest pain and dizziness. At time of assessment, patient denies pain/discomfort, denies CP/SOB. Patient updated on the plan of care, awaiting nuclear stress test. Stretcher locked in lowest position, IV site flushed w/ NS. No redness, swelling or pain noted to site. No signs of acute distress noted, safety maintained. Pt remains on CM in NSR, rate 77, SpO2 97% on room air.
Assumed care of patient. patient is alert and orientated x4. denies pain/discomfort. respirations are even and non-labored. IV site d/c/i. patient on CM SR. patient pending echo and stress test.
Patient AA&Ox4, resp even/unlabored, ambulatory, steady gait noted, discharged home with all belongings. Med rec and discharge instructions explained and given to pt by SERENA Snow. Patient verbalizes understanding on physician follow up, medication regimen and next dose, s/s of complications and monitoring. No distress noted. VSS, recorded in EMR. Peripheral IV removed, intact, bleeding controlled.
Patient remains in stable condition. Patient resting in stretcher comfortably. patient on CM, SB. Pending echo in am
Report received at 1930, care assumed.  69 year old male presents to ED with c/o near syncopal episode.  A&Ox4  CM-NSR  Patient to be NPO after MN for nuclear stress test in AM.  Patient resting comfortably on stretcher at this time in no acute distress.  Offers no complaints at this time.
Patient resting comfortably in bed. No signs of acute distress noted, safety maintained.
Pt AA&Ox4, resp even/unlabored, NAD. All safety and fall precautions remain in place.
Pt A&O x 4 resting comfortably, denies complaints. Airway patent. Respirations unlabored. No JVD or diaphoresis. No dizziness. Report given to OBS JEAN MARIE MENJIVAR Pt Stable at time of transfer of care.
Received patient from JEAN MARIE Powers at 1840, patient A&Ox4 resting in bed, NAD noted, placed on cardiac monitor, vitals stable.  Zulay at bedside. Patient stated he is visiting the [place] from [facility],  had a dizzy spell on Monday and felt palpitations yesterday night. This morning he was visiting his wife admitted  on 6tower he experienced headache and felt his b.p was high and was sent down to ED for evaluation. Patient denies any complaints at this time, headache improved to 3/10, no SOB, chest pain or dizziness at this time. patient placed in observation for TTE and NST in the morning. Food tray provided, safety measures in place, verbalized understanding of POC.

## 2024-07-29 NOTE — ED CDU PROVIDER SUBSEQUENT DAY NOTE - HISTORY
Pt resting comfortably at time of re-assessment. No events overnight. PendingNST and TTE.  Will continue to monitor.
PT placed in OBS, has had no acute incidents or complaints while in CDU PT is stable. PT Placed in OBS for evaluation of intermit SCOTT recently

## 2024-07-29 NOTE — ED CDU PROVIDER DISPOSITION NOTE - PATIENT PORTAL LINK FT
You can access the FollowMyHealth Patient Portal offered by [facility] by registering at the following website: [email or web address]. By joining [facility]’s FollowMyHealth portal, you will also be able to view your health information using other applications (apps) compatible with our system.

## 2024-07-29 NOTE — ED CDU PROVIDER DISPOSITION NOTE - CLINICAL COURSE
Patient with chest pain, seen by cardiology, underwent NST, stable for discharge.  Given copies of all results, understands and agrees to proceed.

## 2024-07-30 PROCEDURE — 85730 THROMBOPLASTIN TIME PARTIAL: CPT

## 2024-07-30 PROCEDURE — 93017 CV STRESS TEST TRACING ONLY: CPT

## 2024-07-30 PROCEDURE — 85025 COMPLETE CBC W/AUTO DIFF WBC: CPT

## 2024-07-30 PROCEDURE — G0378: CPT

## 2024-07-30 PROCEDURE — 80053 COMPREHEN METABOLIC PANEL: CPT

## 2024-07-30 PROCEDURE — 78452 HT MUSCLE IMAGE SPECT MULT: CPT | Mod: MC

## 2024-07-30 PROCEDURE — 70450 CT HEAD/BRAIN W/O DYE: CPT | Mod: MC

## 2024-07-30 PROCEDURE — 36415 COLL VENOUS BLD VENIPUNCTURE: CPT

## 2024-07-30 PROCEDURE — 93306 TTE W/DOPPLER COMPLETE: CPT

## 2024-07-30 PROCEDURE — 83880 ASSAY OF NATRIURETIC PEPTIDE: CPT

## 2024-07-30 PROCEDURE — 71046 X-RAY EXAM CHEST 2 VIEWS: CPT

## 2024-07-30 PROCEDURE — T1013: CPT

## 2024-07-30 PROCEDURE — 84484 ASSAY OF TROPONIN QUANT: CPT

## 2024-07-30 PROCEDURE — 85379 FIBRIN DEGRADATION QUANT: CPT

## 2024-07-30 PROCEDURE — 83690 ASSAY OF LIPASE: CPT

## 2024-07-30 PROCEDURE — 83735 ASSAY OF MAGNESIUM: CPT

## 2024-07-30 PROCEDURE — A9500: CPT

## 2024-07-30 PROCEDURE — 93005 ELECTROCARDIOGRAM TRACING: CPT

## 2024-07-30 PROCEDURE — 85610 PROTHROMBIN TIME: CPT

## 2024-07-30 PROCEDURE — 99285 EMERGENCY DEPT VISIT HI MDM: CPT | Mod: 25
